# Patient Record
(demographics unavailable — no encounter records)

---
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
## 2024-11-01 NOTE — PHYSICAL EXAM
[No Acute Distress] : no acute distress [Well Nourished] : well nourished [Well Developed] : well developed [Well-Appearing] : well-appearing [Normal Sclera/Conjunctiva] : normal sclera/conjunctiva [PERRL] : pupils equal round and reactive to light [EOMI] : extraocular movements intact [Normal Outer Ear/Nose] : the outer ears and nose were normal in appearance [Normal Oropharynx] : the oropharynx was normal [No JVD] : no jugular venous distention [No Lymphadenopathy] : no lymphadenopathy [Thyroid Normal, No Nodules] : the thyroid was normal and there were no nodules present [Supple] : supple [No Respiratory Distress] : no respiratory distress  [No Accessory Muscle Use] : no accessory muscle use [Clear to Auscultation] : lungs were clear to auscultation bilaterally [Normal Rate] : normal rate  [Regular Rhythm] : with a regular rhythm [Normal S1, S2] : normal S1 and S2 [No Murmur] : no murmur heard [No Carotid Bruits] : no carotid bruits [No Varicosities] : no varicosities [Pedal Pulses Present] : the pedal pulses are present [No Edema] : there was no peripheral edema [No Extremity Clubbing/Cyanosis] : no extremity clubbing/cyanosis [Soft] : abdomen soft [Non Tender] : non-tender [Non-distended] : non-distended [Normal Bowel Sounds] : normal bowel sounds [Normal Posterior Cervical Nodes] : no posterior cervical lymphadenopathy [Normal Anterior Cervical Nodes] : no anterior cervical lymphadenopathy [No CVA Tenderness] : no CVA  tenderness [No Spinal Tenderness] : no spinal tenderness [No Joint Swelling] : no joint swelling [Grossly Normal Strength/Tone] : grossly normal strength/tone [No Rash] : no rash [Coordination Grossly Intact] : coordination grossly intact [No Focal Deficits] : no focal deficits [Normal Gait] : normal gait [Normal Affect] : the affect was normal [Normal Insight/Judgement] : insight and judgment were intact [Alert and Oriented x3] : oriented to person, place, and time

## 2024-11-01 NOTE — HEALTH RISK ASSESSMENT
[Former] : Former [0] : 2) Feeling down, depressed, or hopeless: Not at all (0) [PHQ-2 Negative - No further assessment needed] : PHQ-2 Negative - No further assessment needed [[id number]] : 0

## 2024-11-01 NOTE — ADDENDUM
[FreeTextEntry1] : This note was written by Madeline Lee on 10/31/2024 acting as medical scribe for Dr. Dread Alfonso. I, Dr. Dread Alfonso, have read and attest that all the information, medical decision making and discharge instructions within are true and accurate.

## 2024-11-01 NOTE — HEALTH RISK ASSESSMENT
[Former] : Former [0] : 2) Feeling down, depressed, or hopeless: Not at all (0) [PHQ-2 Negative - No further assessment needed] : PHQ-2 Negative - No further assessment needed [[id number]] : 0

## 2024-11-01 NOTE — PHYSICAL EXAM
[No Acute Distress] : no acute distress [Well Nourished] : well nourished [Well Developed] : well developed [Well-Appearing] : well-appearing [Normal Sclera/Conjunctiva] : normal sclera/conjunctiva [PERRL] : pupils equal round and reactive to light [EOMI] : extraocular movements intact [Normal Outer Ear/Nose] : the outer ears and nose were normal in appearance [Normal Oropharynx] : the oropharynx was normal [No JVD] : no jugular venous distention [No Lymphadenopathy] : no lymphadenopathy [Thyroid Normal, No Nodules] : the thyroid was normal and there were no nodules present [Supple] : supple [No Respiratory Distress] : no respiratory distress  [No Accessory Muscle Use] : no accessory muscle use [Clear to Auscultation] : lungs were clear to auscultation bilaterally [Normal Rate] : normal rate  [Regular Rhythm] : with a regular rhythm [Normal S1, S2] : normal S1 and S2 [No Murmur] : no murmur heard [No Carotid Bruits] : no carotid bruits [No Varicosities] : no varicosities [Pedal Pulses Present] : the pedal pulses are present [No Edema] : there was no peripheral edema [No Extremity Clubbing/Cyanosis] : no extremity clubbing/cyanosis [Non Tender] : non-tender [Soft] : abdomen soft [Non-distended] : non-distended [Normal Bowel Sounds] : normal bowel sounds [Normal Posterior Cervical Nodes] : no posterior cervical lymphadenopathy [Normal Anterior Cervical Nodes] : no anterior cervical lymphadenopathy [No CVA Tenderness] : no CVA  tenderness [No Spinal Tenderness] : no spinal tenderness [No Joint Swelling] : no joint swelling [Grossly Normal Strength/Tone] : grossly normal strength/tone [No Rash] : no rash [Coordination Grossly Intact] : coordination grossly intact [No Focal Deficits] : no focal deficits [Normal Gait] : normal gait [Normal Affect] : the affect was normal [Normal Insight/Judgement] : insight and judgment were intact [Alert and Oriented x3] : oriented to person, place, and time

## 2024-11-01 NOTE — HEALTH RISK ASSESSMENT
[Former] : Former [0] : 2) Feeling down, depressed, or hopeless: Not at all (0) [PHQ-2 Negative - No further assessment needed] : PHQ-2 Negative - No further assessment needed [[id number]] : 0

## 2024-11-18 NOTE — ASSESSMENT
[FreeTextEntry1] : Patient is well, states large lump. Minimal discomfort.   Incisions are c/d/i and healing well. + large seroma. aspirated with 50cc serous fluid difficult to aspirate more due to patients size.   f/u 2 weeks

## 2024-12-14 NOTE — REASON FOR VISIT
[Consultation] : a consultation [Abnormal CXR/ Chest CT] : an abnormal CXR/ chest CT [COPD] : COPD [Pulmonary Nodules] : pulmonary nodules [TextBox_13] : Dr. Dread Alfosno

## 2024-12-14 NOTE — ASSESSMENT
[FreeTextEntry1] : Dr. Judge reviewed with LESLEY his PFT and NiOx in office today. PFT was stable compared to baseline and NiOx was unremarkable.  Lesley is clinically asymptomatic from pulmonary perspective, so there is no indication to starting him on his standing COPD medications at this point.   Repeat low-dose lung cancer screening chest CT scan in 4 months for follow up. Return for pulmonary follow-up in 5 months. Also advised him to closely follow with you clinically.

## 2024-12-14 NOTE — ADDENDUM
[FreeTextEntry1] : I, Markus Justin, acted solely as a scribe for Dr. Aviva Judge D.O. on this date 12/12/2024.   All medical record entries made by the Scribe were at my, Dr. Aviva Judge D.O., direction and personally dictated by me on 12/12/2024. I have reviewed the chart and agree that the record accurately reflects my personal performance of the history, physical exam, assessment and plan. I have also personally directed, reviewed, and agreed with the chart.

## 2024-12-14 NOTE — PROCEDURE
[FreeTextEntry1] :   CT Chest Lung Cancer Screening             Final  No Documents Attached    	 EXAM: [id number] - CT LDCT LUNG CA SCREENING  - ORDERED BY: WILLIAM RENEE   PROCEDURE DATE:  04/12/2024    INTERPRETATION:  INDICATION: Lung cancer screening, former cigarette smoker with 50 pack year history, quit in 2023  TECHNIQUE: Helical acquisition images of the chest without intravenous contrast. Maximum intensity projection images were generated.  COMPARISON: None.  FINDINGS:  LUNGS/AIRWAYS/PLEURA: Patent trachea and bronchi. Quantitatively mild emphysema. Few sub-4 mm lung nodules, for example, the right lower lobe (2-77). Mild indistinct groundglass in the dependent aspect of the left lower lobe (2-69). No pleural effusion.  LYMPH NODES/MEDIASTINUM: No lymphadenopathy.  HEART/VASCULATURE: Normal sized heart and aorta. No pericardial effusion. Quantitatively mild coronary calcified plaque.  UPPER ABDOMEN: Unremarkable.  BONES/SOFT TISSUES: Cervical spine hardware.   IMPRESSION:  Few very small lung nodules. Mild faint left lower lobe groundglass that may be due to dependent atelectasis and can be reassessed on subsequent surveillance imaging. Lung RADS 2: Benign based on imaging features are indolent behavior. Recommend low dose screening chest CT in one year.  --- End of Report ---       KARTIK BRUCE M.D., ATTENDING RADIOLOGIST This document has been electronically signed. Apr 17 2024 12:36PM  Ordered by: WILLIAM RENEE       Collected/Examined: 12Apr2024 11:29AM       Verified by: WILLIAM RENEE [id number] 06:14PM       Result Communication: Call patient with results; Stage: Final       Performed at: [facility] at the [facility]       Resulted: [id number] 12:30PM       Last Updated: [id number] 06:14PM       Accession: [id number]    ________ PFT performed today, Dec 12 2024 11:30AM in my office . Spirometry: Within normal limits Lung Volume: Within normal limits Diffusion: Within normal limits --------------

## 2024-12-14 NOTE — CONSULT LETTER
[Dear  ___] : Dear  [unfilled], [Courtesy Letter:] : I had the pleasure of seeing your patient, [unfilled], in my office today. [Please see my note below.] : Please see my note below. [Consult Closing:] : Thank you very much for allowing me to participate in the care of this patient.  If you have any questions, please do not hesitate to contact me. [Sincerely,] : Sincerely, [FreeTextEntry3] : Dr. Aviva Judge

## 2024-12-14 NOTE — PROCEDURE
[FreeTextEntry1] :   CT Chest Lung Cancer Screening             Final  No Documents Attached    	 EXAM: [id number] - CT LDCT LUNG CA SCREENING  - ORDERED BY: WILLIAM RENEE   PROCEDURE DATE:  04/12/2024    INTERPRETATION:  INDICATION: Lung cancer screening, former cigarette smoker with 50 pack year history, quit in 2023  TECHNIQUE: Helical acquisition images of the chest without intravenous contrast. Maximum intensity projection images were generated.  COMPARISON: None.  FINDINGS:  LUNGS/AIRWAYS/PLEURA: Patent trachea and bronchi. Quantitatively mild emphysema. Few sub-4 mm lung nodules, for example, the right lower lobe (2-77). Mild indistinct groundglass in the dependent aspect of the left lower lobe (2-69). No pleural effusion.  LYMPH NODES/MEDIASTINUM: No lymphadenopathy.  HEART/VASCULATURE: Normal sized heart and aorta. No pericardial effusion. Quantitatively mild coronary calcified plaque.  UPPER ABDOMEN: Unremarkable.  BONES/SOFT TISSUES: Cervical spine hardware.   IMPRESSION:  Few very small lung nodules. Mild faint left lower lobe groundglass that may be due to dependent atelectasis and can be reassessed on subsequent surveillance imaging. Lung RADS 2: Benign based on imaging features are indolent behavior. Recommend low dose screening chest CT in one year.  --- End of Report ---       KARTIK BRUCE M.D., ATTENDING RADIOLOGIST This document has been electronically signed. Apr 17 2024 12:36PM  Ordered by: WILLIAM RENEE       Collected/Examined: 12Apr2024 11:29AM       Verified by: WILLIAM RENEE [id number] 06:14PM       Result Communication: Call patient with results; Stage: Final       Performed at: [facility] at the [facility]       Resulted: [id number] 12:30PM       Last Updated: [id number] 06:14PM       Accession: [id number]    ________ PFT performed today, Dec 12 2024 11:30AM in my office . Spirometry: Within normal limits Lung Volume: Within normal limits Diffusion: Within normal limits --------------

## 2024-12-14 NOTE — HISTORY OF PRESENT ILLNESS
[Former] : former [Current] : current [TextBox_4] : LESLEY STYLES is a 62 year male who presents to the office for follow up evaluation. LESLEY is overall feeling well at this time; no respiratory complaints. He denies of having any symptoms of chest pain, dyspnea, or a cough. Patient reports of having a Hx of cigarette smoking for 50 years, 1 pack a day, quit 2 years ago. Here to review recent chest CT scan. [TextBox_11] : 1 [TextBox_13] : 50 [YearQuit] : 2023

## 2024-12-14 NOTE — DISCUSSION/SUMMARY
[FreeTextEntry1] : Mr. LESLEY STYLES is an 62 year old male, history of longstanding cigarette smoking, mild emphysema. Tiny lung nodules indicate in recent low-dose lung cancer screening chest CT scan, likely secondary to postinflammatory changes/scarring.

## 2024-12-14 NOTE — REASON FOR VISIT
[Consultation] : a consultation [Abnormal CXR/ Chest CT] : an abnormal CXR/ chest CT [COPD] : COPD [Pulmonary Nodules] : pulmonary nodules [TextBox_13] : Dr. Dread Alfonso

## 2024-12-16 NOTE — ASSESSMENT
[FreeTextEntry1] : patient is well, has no pain. still with concerns of large lump.   incisions are well healed. + seroma again appreciated.  discussed f/u in 1 month without aspiration as he has no pain related to fluid.  f/u 1 month

## 2025-02-14 NOTE — HISTORY OF PRESENT ILLNESS
[de-identified] : Steve was last seen: 8/8/24 [de-identified] : Reason for visit: low grade lymphoma  Since last visit: anxiety; problems sleeping (using 3 drinks a night to help sleep); lives by himself. "I'm disabled": No suicidal ideation.  He forgot that he was here for lymphoma and that I am an oncologist    Medications: none - non complaint with BP meds as they caused a BOWER.  NKDA  Steve does not spontaneously report: fever, chills, sweats, weight loss, skin rashes, petechiae, bruising, eye irritation, hearing loss, mouth sores, sore throat, cough, hemoptysis, pleuritic chest pain, dyspnea, change in vision, focal numbness/weakness, chest pains, palpitations, nausea, vomiting, diarrhea, constipation, dysuria, hematuria, bowel or bladder incontinence, sever joint pain, back pain or gait disturbance.  Examination: Steve is articulate and in no acute distress 194/103 reduced hearing No occiput, poster cervical, anterior cervical, submandibular, sublingual, submental, supraclavicular nor axillary adenopathy Lungs: Clear Cardiac: without rubs Abd: soft and non-tender, large left sided abd wall weakness; habitus prevents evaluation for organomegaly.  No inguinal nor femoral adenopathy No sig peripheral edema Gait: unencumbered  Thought process is not disorganized, trouble focusing but oriented to self, time and memory is fine.   Data 06/13/24: WBC 8.9, Hgb 14.6, ,000 07/18/24: WBC 8.0, Hgb 14.4, ,000, IgM 500 () 02/13/25: WBC 9.3, Hgb 14.3, ,000, IgM pending

## 2025-02-14 NOTE — HISTORY OF PRESENT ILLNESS
[de-identified] : Steve was last seen: 8/8/24 [de-identified] : Reason for visit: low grade lymphoma  Since last visit: anxiety; problems sleeping (using 3 drinks a night to help sleep); lives by himself. "I'm disabled": No suicidal ideation.  He forgot that he was here for lymphoma and that I am an oncologist    Medications: none - non complaint with BP meds as they caused a BOWER.  NKDA  Steve does not spontaneously report: fever, chills, sweats, weight loss, skin rashes, petechiae, bruising, eye irritation, hearing loss, mouth sores, sore throat, cough, hemoptysis, pleuritic chest pain, dyspnea, change in vision, focal numbness/weakness, chest pains, palpitations, nausea, vomiting, diarrhea, constipation, dysuria, hematuria, bowel or bladder incontinence, sever joint pain, back pain or gait disturbance.  Examination: Steve is articulate and in no acute distress 194/103 reduced hearing No occiput, poster cervical, anterior cervical, submandibular, sublingual, submental, supraclavicular nor axillary adenopathy Lungs: Clear Cardiac: without rubs Abd: soft and non-tender, large left sided abd wall weakness; habitus prevents evaluation for organomegaly.  No inguinal nor femoral adenopathy No sig peripheral edema Gait: unencumbered  Thought process is not disorganized, trouble focusing but oriented to self, time and memory is fine.   Data 06/13/24: WBC 8.9, Hgb 14.6, ,000 07/18/24: WBC 8.0, Hgb 14.4, ,000, IgM 500 () 02/13/25: WBC 9.3, Hgb 14.3, ,000, IgM pending
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
## 2025-02-28 NOTE — HEALTH RISK ASSESSMENT
[0] : 2) Feeling down, depressed, or hopeless: Not at all (0) [PHQ-2 Negative - No further assessment needed] : PHQ-2 Negative - No further assessment needed [Former] : Former [[id number]] : 0
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
## 2025-02-28 NOTE — ADDENDUM
[FreeTextEntry1] : This note was written by Madeline Lee on 02/27/2025 acting as medical scribe for Dr. Dread Alfonso. I, Dr. Dread Alfonso, have read and attest that all the information, medical decision making and discharge instructions within are true and accurate.

## 2025-02-28 NOTE — HEALTH RISK ASSESSMENT
[0] : 2) Feeling down, depressed, or hopeless: Not at all (0) [PHQ-2 Negative - No further assessment needed] : PHQ-2 Negative - No further assessment needed [Former] : Former [[id number]] : 0

## 2025-03-08 NOTE — ADDENDUM
[FreeTextEntry1] : This note was written by Pati Johnson on 03/07/2025 acting as medical scribe for Dr. Dread Alfonso. I, Dr. Dread Alfonso, have read and attest that all the information, medical decision making and discharge instructions within are true and accurate.
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
## 2025-03-08 NOTE — HEALTH RISK ASSESSMENT
[0] : 2) Feeling down, depressed, or hopeless: Not at all (0) [PHQ-2 Negative - No further assessment needed] : PHQ-2 Negative - No further assessment needed [Former] : Former [[id number]] : 0

## 2025-03-08 NOTE — HEALTH RISK ASSESSMENT
[0] : 2) Feeling down, depressed, or hopeless: Not at all (0) [PHQ-2 Negative - No further assessment needed] : PHQ-2 Negative - No further assessment needed [Former] : Former [[id number]] : 0
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
## 2025-03-10 NOTE — ASSESSMENT
[FreeTextEntry1] : Patient is well, states recently diagnosed with "stomach cancer". here to f/u CT scan results.   abdomen is soft, + mass noted again which corresponds to seroma on CT.  60cc serous fluid was aspirated.  f/u 1 month.   to f/u with oncology regarding new lymphoma diagnosis.

## 2025-04-11 NOTE — HISTORY OF PRESENT ILLNESS
[Former] : Former [TextBox_13] : Patient is scheduled for a annual  LDCT for lung cancer screening. Chart review performed to confirm eligibility for LDCT.    No documented personal or family history of lung cancer. No documented s/s of lung cancer. Pt is a former smoker (2023) with a 50 pack year hx. [PacksperYear] : 50

## 2025-05-07 NOTE — PHYSICAL EXAM
[No Acute Distress] : no acute distress [Well Nourished] : well nourished [Well Developed] : well developed [Well-Appearing] : well-appearing [Normal Sclera/Conjunctiva] : normal sclera/conjunctiva [PERRL] : pupils equal round and reactive to light [EOMI] : extraocular movements intact [Normal Outer Ear/Nose] : the outer ears and nose were normal in appearance [Normal Oropharynx] : the oropharynx was normal [No JVD] : no jugular venous distention [No Lymphadenopathy] : no lymphadenopathy [Supple] : supple [Thyroid Normal, No Nodules] : the thyroid was normal and there were no nodules present [No Respiratory Distress] : no respiratory distress  [No Accessory Muscle Use] : no accessory muscle use [Clear to Auscultation] : lungs were clear to auscultation bilaterally [Normal Rate] : normal rate  [Regular Rhythm] : with a regular rhythm [Normal S1, S2] : normal S1 and S2 [No Murmur] : no murmur heard [No Carotid Bruits] : no carotid bruits [No Varicosities] : no varicosities [Pedal Pulses Present] : the pedal pulses are present [No Edema] : there was no peripheral edema [No Extremity Clubbing/Cyanosis] : no extremity clubbing/cyanosis [Soft] : abdomen soft [Non Tender] : non-tender [Non-distended] : non-distended [Normal Bowel Sounds] : normal bowel sounds [Normal Posterior Cervical Nodes] : no posterior cervical lymphadenopathy [Normal Anterior Cervical Nodes] : no anterior cervical lymphadenopathy [No CVA Tenderness] : no CVA  tenderness [No Spinal Tenderness] : no spinal tenderness [No Joint Swelling] : no joint swelling [Grossly Normal Strength/Tone] : grossly normal strength/tone [No Rash] : no rash [Coordination Grossly Intact] : coordination grossly intact [No Focal Deficits] : no focal deficits [Normal Gait] : normal gait [Normal Affect] : the affect was normal

## 2025-05-08 NOTE — REVIEW OF SYSTEMS
[Fever] : no fever [Night Sweats] : no night sweats [Discharge] : no discharge [Vision Problems] : no vision problems [Earache] : no earache [Nasal Discharge] : no nasal discharge [Orthopena] : no orthopnea [Chest Pain] : no chest pain [Shortness Of Breath] : no shortness of breath [Abdominal Pain] : no abdominal pain [Vomiting] : no vomiting [Dysuria] : no dysuria [Hematuria] : no hematuria [Joint Pain] : no joint pain [Back Pain] : no back pain [Itching] : no itching [Skin Rash] : no skin rash [Headache] : no headache [Memory Loss] : no memory loss [Suicidal] : not suicidal [Easy Bleeding] : no easy bleeding

## 2025-05-08 NOTE — HISTORY OF PRESENT ILLNESS
[FreeTextEntry8] : 63 year old male presents with complaints of dizziness for the past 3-4 days.  He reports that he feels as if the room is spinning when he lies down flat.  He denies any chest pain, palpitations, shortness of breath, headaches or speech/motor impairment.

## 2025-05-08 NOTE — HISTORY OF PRESENT ILLNESS
[FreeTextEntry8] : 63 year old male presents with complaints of dizziness for the past 3-4 days.  He reports that he feels as if the room is spinning when he lies down flat.  He denies any chest pain, palpitations, shortness of breath, headaches or speech/motor impairment.   Strong peripheral pulses

## 2025-05-08 NOTE — HEALTH RISK ASSESSMENT
[No falls in past year] : Patient reported no falls in the past year [0] : 2) Feeling down, depressed, or hopeless: Not at all (0) [PHQ-2 Negative - No further assessment needed] : PHQ-2 Negative - No further assessment needed [[id number]] : 0

## 2025-05-08 NOTE — PLAN
[FreeTextEntry1] : Dizziness - likely BPPV, Vitals/PE stable, will check labs, start meclizine prn HTN - borderline controlled, c/w same Pre-DM/obesity - advised diet/ls modifications, will check labs  f/u in 2 weeks or earlier if symptoms are not improving

## 2025-05-08 NOTE — HEALTH RISK ASSESSMENT
[No falls in past year] : Patient reported no falls in the past year [0] : 2) Feeling down, depressed, or hopeless: Not at all (0) [PHQ-2 Negative - No further assessment needed] : PHQ-2 Negative - No further assessment needed [[id number]] : 0

## 2025-05-23 NOTE — PLAN
[FreeTextEntry1] : Dizziness - symptoms have resolved, used meclizine 4 times but none in the last week, advised neurology/ENT f/u H/o marginal zone lymphoma - following w/ oncology, Dr. Stoddard Lung nodule - f/u with pulmonary DM-2 - pt hesitant on starting medication, will consider in future, advised diet/lifestyle modifications HTN- controlled, c/w same, check labs Abnormal CBC - will repeat today Coronary artery calcification - incidental finding, has referral for CT calcium score, was prescribed statin but pt did not start taking yet, advised to start and f/u with cardiology

## 2025-05-23 NOTE — HISTORY OF PRESENT ILLNESS
[FreeTextEntry1] : follow-up, dizziness [de-identified] : 63 year old male here for a f/u visit. Pt was seen by me a few weeks ago with dizziness and and managed conservatively with meclizine. He reports his dizziness has improved and he used meclizine only 4 times in the last 2 weeks.  His dizziness has resolved and the last episode was over 1 week ago.  Currently he denies any chest pain, palpitations or shortness of breath.

## 2025-05-23 NOTE — PHYSICAL EXAM
[No Acute Distress] : no acute distress [Normal Sclera/Conjunctiva] : normal sclera/conjunctiva [Normal Outer Ear/Nose] : the outer ears and nose were normal in appearance [No JVD] : no jugular venous distention [No Respiratory Distress] : no respiratory distress  [No Accessory Muscle Use] : no accessory muscle use [Clear to Auscultation] : lungs were clear to auscultation bilaterally [Normal Rate] : normal rate  [Regular Rhythm] : with a regular rhythm [Normal S1, S2] : normal S1 and S2 [No Edema] : there was no peripheral edema [Soft] : abdomen soft [No CVA Tenderness] : no CVA  tenderness [No Joint Swelling] : no joint swelling [de-identified] : poor oral dentition

## 2025-05-23 NOTE — PHYSICAL EXAM
Called patient to reschedule appointment on 8/03 with Dr. Barbosa however patient did not answer and voicemail is not set up. PSR unable to leave voice message. Reschedule letter sent.   [No Acute Distress] : no acute distress [Normal Sclera/Conjunctiva] : normal sclera/conjunctiva [Normal Outer Ear/Nose] : the outer ears and nose were normal in appearance [No JVD] : no jugular venous distention [No Respiratory Distress] : no respiratory distress  [No Accessory Muscle Use] : no accessory muscle use [Clear to Auscultation] : lungs were clear to auscultation bilaterally [Normal Rate] : normal rate  [Regular Rhythm] : with a regular rhythm [Normal S1, S2] : normal S1 and S2 [No Edema] : there was no peripheral edema [Soft] : abdomen soft [No CVA Tenderness] : no CVA  tenderness [No Joint Swelling] : no joint swelling [de-identified] : poor oral dentition

## 2025-05-23 NOTE — REVIEW OF SYSTEMS
[Fever] : no fever [Night Sweats] : no night sweats [Discharge] : no discharge [Vision Problems] : no vision problems [Earache] : no earache [Nasal Discharge] : no nasal discharge [Chest Pain] : no chest pain [Orthopena] : no orthopnea [Shortness Of Breath] : no shortness of breath [Abdominal Pain] : no abdominal pain [Vomiting] : no vomiting [Dysuria] : no dysuria [Hematuria] : no hematuria [Joint Pain] : no joint pain [Back Pain] : no back pain [Itching] : no itching [Skin Rash] : no skin rash [Headache] : no headache [Memory Loss] : no memory loss [Suicidal] : not suicidal

## 2025-05-23 NOTE — HISTORY OF PRESENT ILLNESS
[FreeTextEntry1] : follow-up, dizziness [de-identified] : 63 year old male here for a f/u visit. Pt was seen by me a few weeks ago with dizziness and and managed conservatively with meclizine. He reports his dizziness has improved and he used meclizine only 4 times in the last 2 weeks.  His dizziness has resolved and the last episode was over 1 week ago.  Currently he denies any chest pain, palpitations or shortness of breath.